# Patient Record
Sex: MALE | Race: WHITE
[De-identification: names, ages, dates, MRNs, and addresses within clinical notes are randomized per-mention and may not be internally consistent; named-entity substitution may affect disease eponyms.]

---

## 2017-03-20 ENCOUNTER — HOSPITAL ENCOUNTER (EMERGENCY)
Dept: HOSPITAL 63 - ER | Age: 62
Discharge: HOME | End: 2017-03-20
Payer: OTHER GOVERNMENT

## 2017-03-20 VITALS — HEIGHT: 65 IN | BODY MASS INDEX: 31.65 KG/M2 | WEIGHT: 190 LBS

## 2017-03-20 VITALS — SYSTOLIC BLOOD PRESSURE: 101 MMHG | DIASTOLIC BLOOD PRESSURE: 68 MMHG

## 2017-03-20 DIAGNOSIS — M54.31: Primary | ICD-10-CM

## 2017-03-20 DIAGNOSIS — E11.9: ICD-10-CM

## 2017-03-20 DIAGNOSIS — I10: ICD-10-CM

## 2017-03-20 DIAGNOSIS — M19.90: ICD-10-CM

## 2017-03-20 PROCEDURE — 96372 THER/PROPH/DIAG INJ SC/IM: CPT

## 2017-03-20 PROCEDURE — 99284 EMERGENCY DEPT VISIT MOD MDM: CPT

## 2017-03-20 RX ADMIN — KETOROLAC TROMETHAMINE ONE MG: 30 INJECTION, SOLUTION INTRAMUSCULAR at 20:45

## 2017-03-20 RX ADMIN — ORPHENADRINE CITRATE ONE MG: 30 INJECTION INTRAMUSCULAR; INTRAVENOUS at 20:45

## 2017-03-20 RX ADMIN — ORPHENADRINE CITRATE ONE MG: 30 INJECTION INTRAMUSCULAR; INTRAVENOUS at 20:44

## 2017-03-20 RX ADMIN — KETOROLAC TROMETHAMINE ONE MG: 30 INJECTION, SOLUTION INTRAMUSCULAR at 20:46

## 2017-03-20 NOTE — ED.ADGEN
Past History


Past Medical History:  Diabetes, Hypertension, Sciatica





Adult General


Chief Complaint


Chief Complaint


"  .. I was sanding all day yesterday..and got my back out of wack..."





HPI


HPI





Patient is a 61 year old male who presents with above hx acute onset of lumbar 

sacral muscle spasm and some sciatica. Patient has history of some chronic 

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 He denies any problems 

with cancer. Patient denies any fevers. Patient denies any marked trauma. No hx 

of IV drug use. No hx of immunosuppression.   Patient denies any problems with 

defecation or urination. Pain is localized in lumbar sacral muscles and 

radiates into right sciatica.  No midline tenderness. Patient states his sugars 

have been well controlled. Patient normally follows at [facility]





Review of Systems


Review of Systems





Constitutional: Denies fever or chills []


Eyes: Denies change in visual acuity, redness, or eye pain []


HENT: Denies nasal congestion or sore throat []


Respiratory: Denies cough or shortness of breath []


Cardiovascular: No additional information not addressed in HPI []


GI: Denies abdominal pain, nausea, vomiting, bloody stools or diarrhea []


: Denies dysuria or hematuria []


Musculoskeletal: Denies back pain or joint pain []complaints of low back pain


Integument: Denies rash or skin lesions []


Neurologic: Denies headache, focal weakness or sensory changes []


Endocrine: Denies polyuria or polydipsia []





Family History


Family History


Noncontributory





Current Medications


Current Medications





 Current Medications








 Medications


  (Trade)  Dose


 Ordered  Sig/Matthieu  Start Time


 Stop Time Status Last Admin


Dose Admin


 


 Ketorolac


 Tromethamine


  (Toradol)  30 mg  1X  ONCE  3/20/17 21:15


 3/20/17 21:15 DC 3/20/17 20:44


30 MG


 


 Methylprednisolone


 Acetate


  (Depo-Medrol)  40 mg  1X  ONCE  3/20/17 20:45


 3/20/17 20:46 DC 3/20/17 20:45


40 MG


 


 Morphine Sulfate


  (Morphine 10mg


 Syringe)  10 mg  1X  ONCE  3/20/17 20:45


 3/20/17 20:46 DC 3/20/17 20:44


10 MG


 


 Orphenadrine


 Citrate


  (Norflex)  60 mg  1X  ONCE  3/20/17 20:45


 3/20/17 20:46 DC 3/20/17 20:45


60 MG





See nursing for home medications





Allergies


Allergies





 Allergies








Coded Allergies Type Severity Reaction Last Updated Verified


 


  No Known Drug Allergies    3/20/17 No











Physical Exam


Physical Exam





Constitutional: Moderately acute distress, non-toxic appearance. []


HENT: Normocephalic, atraumatic, bilateral external ears normal, oropharynx 

moist, no oral exudates, nose normal. []


Eyes: PERRLA, EOMI, conjunctiva normal, no discharge. [] 


Neck: Normal range of motion, no tenderness, supple, no stridor. [] 


Cardiovascular:Heart rate regular rhythm, no murmur []


Lungs & Thorax:  Bilateral breath sounds equal with scattered wheezes 

Auscultation []


Abdomen: Bowel sounds normal, soft, no tenderness, no masses, no pulsatile 

masses. No saddle loss. Patient declines rectal exam at this time


Skin: Warm, dry, no erythema, no rash. [] 


Back: Sacral and right sciatic tenderness, no CVA tenderness. No midline 

tenderness.


Extremities: No tenderness, no cyanosis, no clubbing, ROM intact, no edema. [] 


Neurologic: Alert and oriented X 3, normal motor function, normal sensory 

function, no focal deficits noted. DTRs +2 at patella. Can do straight leg lift 

however increases pain on right.


Psychologic: Affect normal, judgement normal, mood normal. []





Current Patient Data


Vital Signs





 Vital Signs








  Date Time  Temp Pulse Resp B/P Pulse Ox O2 Delivery O2 Flow Rate FiO2


 


3/20/17 20:20 98.3 110 16  95 Room Air  











EKG


EKG


[]





Radiology/Procedures


Radiology/Procedures


[]





Course & Med Decision Making


Course & Med Decision Making


Pertinent Labs and Imaging studies reviewed. (See chart for details). Pt.  can 

use ice packs as needed for next 3 days. After 3 days if no re-injury may 

advance to moist heat. Patient to take Tylenol or ibuprofen for pain. For 

marked pain patient may take Vicoprofen up 4 times a day. For marked spasms may 

take Flexeril 5 mg up 4 times a day. Patient follow-up primary care patient 

return if any concerns.  Pt. must follow up.





[]





Final Impression


Final Impression


1.   Sciatica[]


2.  Hx DJD


3.  Hx. DM


Problems:  





Dragon Disclaimer


Dragon Disclaimer


This electronic medical record was generated, in whole or in part, using a 

voice recognition dictation system.








MURIEL ELIZABETH MD Mar 20, 2017 20:21

## 2018-07-02 ENCOUNTER — HOSPITAL ENCOUNTER (EMERGENCY)
Dept: HOSPITAL 63 - ER | Age: 63
Discharge: HOME | End: 2018-07-02
Payer: OTHER GOVERNMENT

## 2018-07-02 VITALS
SYSTOLIC BLOOD PRESSURE: 128 MMHG | DIASTOLIC BLOOD PRESSURE: 93 MMHG | SYSTOLIC BLOOD PRESSURE: 128 MMHG | DIASTOLIC BLOOD PRESSURE: 93 MMHG

## 2018-07-02 VITALS — BODY MASS INDEX: 31.18 KG/M2 | WEIGHT: 194 LBS | HEIGHT: 66 IN

## 2018-07-02 DIAGNOSIS — E11.9: ICD-10-CM
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
## 2018-07-02 DIAGNOSIS — I10: ICD-10-CM

## 2018-07-02 DIAGNOSIS — S61.210A: Primary | ICD-10-CM
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
## 2018-07-02 DIAGNOSIS — E78.00: ICD-10-CM

## 2018-07-02 PROCEDURE — 99283 EMERGENCY DEPT VISIT LOW MDM: CPT

## 2018-07-02 NOTE — PHYS DOC
Past History


Past Medical History:  Diabetes, High Cholesterol, Hypertension


Past Surgical History:  Knee Replacement


Alcohol Use:  None


Drug Use:  None





Adult General


Chief Complaint


Chief Complaint:  LACERATION/AVULSION





HPI


HPI


62-year-old male presents with right digit laceration. The patient was slicing 

pickles and he accidentally cut the end of his finger off.  He didn't notice it 

at first but as he was about to make another past he saw that his finger was 

bleeding. He was able to retrieve the sliced a piece of skin and brought to the 

emergency room with him. He was unable to get up stop bleeding at home. Because 

section does include a small piece of the distal nailbed.  He takes a baby 

aspirin daily. He is on no other blood thinners. He denies any other injuries.





Review of Systems


Review of Systems





Constitutional: Denies fever or chills []


Eyes: Denies change in visual acuity, redness, or eye pain []


HENT: Denies nasal congestion or sore throat []


Respiratory: Denies cough or shortness of breath []


Cardiovascular: No additional information not addressed in HPI []


GI: Denies abdominal pain, nausea, vomiting, bloody stools or diarrhea []


: Denies dysuria or hematuria []


Musculoskeletal: Right second digit laceration[]


Integument: Denies rash or skin lesions []


Neurologic: Denies headache, focal weakness or sensory changes []


Endocrine: Denies polyuria or polydipsia []





All other systems were reviewed and found to be within normal limits, except as 

documented in this note.





Current Medications


Current Medications





Current Medications








 Medications


  (Trade)  Dose


 Ordered  Sig/[facility]  Start Time


 Stop Time Status Last Admin


Dose Admin


 


 Lidocaine/


 Epinephrine


  (Let Topical)  3 ml  1X  ONCE  7/2/18 13:15


 7/2/18 13:16 UNV  














Allergies


Allergies





Allergies








Coded Allergies Type Severity Reaction Last Updated Verified


 


  No Known Drug Allergies    3/20/17 No











Physical Exam


Physical Exam





Constitutional: Well developed, well nourished, no acute distress, non-toxic 

appearance. []


HENT: Normocephalic, atraumatic, bilateral external ears normal, oropharynx 

moist, no oral exudates, nose normal. []


Eyes: PERRLA, EOMI, conjunctiva normal, no discharge. [] 


Neck: Normal range of motion, no tenderness, supple, no stridor. [] 


Cardiovascular:Heart rate regular rhythm, no murmur []


Lungs & Thorax:  Bilateral breath sounds clear to auscultation []


Abdomen: Bowel sounds normal, soft, no tenderness, no masses, no pulsatile 

masses. [] 


Skin: Warm, dry, no erythema, no rash. [] 


Back: No tenderness, no CVA tenderness. [] 


Extremities: 1.5 cm x 1.5 cm laceration distal second digit of the right hand. 

Includes small piece of nail bed 1-2mm.[] 


Neurologic: Alert and oriented X 3, normal motor function, normal sensory 

function, no focal deficits noted. []


Psychologic: Affect normal, judgement normal, mood normal. []





Current Patient Data


Vital Signs





 Vital Signs








  Date Time  Temp Pulse Resp B/P (MAP) Pulse Ox O2 Delivery O2 Flow Rate FiO2


 


7/2/18 12:47 98.1 80 18  100 Room Air  











EKG


EKG


[]





Radiology/Procedures


Radiology/Procedures


[]





Course & Med Decision Making


Course & Med Decision Making


Pertinent Labs and Imaging studies reviewed. (See chart for details)


The patient's laceration took a complete piece of skin off. The skin cannot be 

sewn back on his there will be no vascular supply. Due to the width of the 

laceration, it is not practical to close the wound as it will put undue stress 

on the skin of the finger. I was able to get it to stop bleeding with pressure 

and a tourniquet. We cauterized the small area with silver nitrate. Bleeding 

was controlled. The wound was dressed with Xeroform and Kerlix.  The patient is 

stable for discharge at this time. He has been advised to make an appointment 

with his PCP in 4 days for reevaluation.


[]





Dragon Disclaimer


Dragon Disclaimer


This electronic medical record was generated, in whole or in part, using a 

voice recognition dictation system.





Departure


Departure:


Referrals:  


SERGIO MCCANN MD (PCP)











DEANNA BURNETTE DO Jul 2, 2018 13:07

## 2021-01-26 ENCOUNTER — HOSPITAL ENCOUNTER (OUTPATIENT)
Dept: HOSPITAL 63 - DXRAD | Age: 66
End: 2021-01-26
Attending: PHYSICIAN ASSISTANT
Payer: OTHER GOVERNMENT

## 2021-01-26 DIAGNOSIS — M19.011: Primary | ICD-10-CM

## 2021-01-26 PROCEDURE — 73030 X-RAY EXAM OF SHOULDER: CPT

## 2021-01-26 NOTE — RAD
EXAM: Right shoulder, 3 views.



HISTORY: Pain.



COMPARISON: None.



FINDINGS: 3 views of the right shoulder obtained. There is no fracture, dislocation or subluxation. T
here is minimal inferior glenohumeral spurring.



IMPRESSION: Minimal glenohumeral osteoarthritis.



Electronically signed by: Arlette Gonsalez MD (1/26/2021 12:54 PM) [id number]

## 2021-06-04 ENCOUNTER — HOSPITAL ENCOUNTER (OUTPATIENT)
Dept: HOSPITAL 61 - SURG | Age: 66
Discharge: HOME | End: 2021-06-04
Attending: ORTHOPAEDIC SURGERY
Payer: OTHER GOVERNMENT

## 2021-06-04 VITALS — BODY MASS INDEX: 24.8 KG/M2 | HEIGHT: 66 IN | WEIGHT: 154.32 LBS

## 2021-06-04 VITALS — SYSTOLIC BLOOD PRESSURE: 109 MMHG | DIASTOLIC BLOOD PRESSURE: 74 MMHG

## 2021-06-04 DIAGNOSIS — Z87.891: ICD-10-CM
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
## 2021-06-04 DIAGNOSIS — I12.9: ICD-10-CM

## 2021-06-04 DIAGNOSIS — N18.9: ICD-10-CM
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
## 2021-06-04 DIAGNOSIS — M19.90: ICD-10-CM

## 2021-06-04 DIAGNOSIS — Z98.890: ICD-10-CM

## 2021-06-04 DIAGNOSIS — E11.22: ICD-10-CM

## 2021-06-04 DIAGNOSIS — Z72.89: ICD-10-CM

## 2021-06-04 DIAGNOSIS — S43.431A: Primary | ICD-10-CM

## 2021-06-04 DIAGNOSIS — M25.311: ICD-10-CM

## 2021-06-04 DIAGNOSIS — Z79.899: ICD-10-CM
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
## 2021-06-04 PROCEDURE — 29828 SHO ARTHRS SRG BICP TENODSIS: CPT

## 2021-06-04 PROCEDURE — A4930 STERILE, GLOVES PER PAIR: HCPCS

## 2021-06-04 PROCEDURE — C1713 ANCHOR/SCREW BN/BN,TIS/BN: HCPCS

## 2021-06-04 PROCEDURE — 82962 GLUCOSE BLOOD TEST: CPT

## 2021-06-04 PROCEDURE — 29823 SHO ARTHRS SRG XTNSV DBRDMT: CPT

## 2021-06-04 PROCEDURE — A4565 SLINGS: HCPCS

## 2021-06-04 PROCEDURE — 64415 NJX AA&/STRD BRCH PLXS IMG: CPT

## 2021-06-04 NOTE — PDOC4
Operative Note


Operative Note


Date of surgery: 6/4/2041





Preoperative diagnosis: Rotator cuff and superior labral tear





Postoperative diagnosis: Same with severe labral fraying and full-thickness 

distal supraspinatus tear and biceps flattening and fraying





Operative procedure: Right shoulder arthroscopy biceps tenodesis with extensive 

debridement of labrum and partial-thickness rotator cuff tear





Surgeon: Nicholas





Assist: Kamron fleming





Anesthesia: General plus scalene block





Estimated blood loss: 5 cc





Complications: None





Operative indications: Please see my preoperative clinic note for detailed 

operative indications and note that we had reviewed risk benefits postoperative 

course and possible complications of nonhealing infection nerve or blood vessel 

damage continued pain to call or other anesthetic complications among others and

informed consent was obtained to proceed with surgical evaluation and treatment





Operative text: Patient was identified procedure verified patient placed in the 

supine position on the operating table and after adequate amounts of general 

anesthesia were administered placed in the decubitus position right side up all 

bony prominences were well-padded.  Right shoulder was examined under anesthesia

found to have full range of motion with no instability in the right shoulder was

prepped and draped in standard sterile fashion and the right arm placed in 

arthroscopic arm hay with a total of 10 pounds of traction.  After timeout 

was performed patient procedure identified and verified a standard posterior 

portal was established an anterior portal established using spinal needle 

localization and the shoulder joint was systematically examined.  There was 

extensive labral fraying and biceps was compromised with significant fraying in 

the bicipital groove.  Rotator cuff was noted to have a partial-thickness 

undersurface supraspinatus tear which was trimmed back to stable tissue and the 

area of most involvement was marked with a PDS suture, biceps was tenotomized 

and labrum was extensively debrided back to stable tissue.  Subscapularis 

insertion capsuloligamentous structures and bare area of the humerus were all 

noted to be intact and glenohumeral cartilage well preserved.  Subacromial space

was then entered and bursa was cleared to allow adequate visualization and the 

PDS suture was located and rotator cuff was noted to be scuffed with partial 

tear which was again debrided back to stable tissue but not thought to need a 

rotator cuff repair.  Biceps was retrieved through the anterior portal and 

drilling carried out over a guidewire in the bicipital groove biceps was well 

tensioned and contour maintained with fixation using a Quatro bolt anchor.  

Joint was drained of arthroscopic fluid portals closed with subcutaneous Vicryl 

and nylon suture sterile absorbent dressings were placed patient was placed in a

sling and patient was returned to recovery room stable condition having 

tolerated procedure well.  Kamron fleming was present for the 

procedure and assisted patient positioning prepping draping assistance with 

equipment closure and dressings











HAYLEE MITCHELL MD            Jun 4, 2021 15:46

## 2021-06-04 NOTE — DISCH
DISCHARGE INSTRUCTIONS


Condition on Discharge


Condition on Discharge:  Stable





Activity After Discharge


Activity Instructions for Disc:  Other, see below (5 pound elbow flexion limit)


Lifting Instructions after Dis:  No heavy lifting, No pulling or pushing (May 

use right arm for fine motor use such as eating writing typing no lifting 

pulling pushing)


Weight Bearing Status after Di:  Non weight bearing





Diet after Discharge


Diet after Discharge:  Diabetic No Calorie Level





Wound Incision Care


Wound/Incision Care:  Change dressing (Remove dressing in 2 days may then 

shower, replace with Band-Aids)





Community/Resources/Services


Services at Discharge:  PT EVALUATE & TREAT (Active and passive range of motion 

starting rotator cuff strengthening as tolerated)





Contacting the  after DC


Call your doctor for:  Concerns you may have





Follow-Up


Follow up with:  Dr. Peterson or Akanksha 10 days











HAYLEE PETERSON MD            Jun 4, 2021 11:50